# Patient Record
Sex: FEMALE | Race: WHITE | NOT HISPANIC OR LATINO | Employment: FULL TIME | URBAN - METROPOLITAN AREA
[De-identification: names, ages, dates, MRNs, and addresses within clinical notes are randomized per-mention and may not be internally consistent; named-entity substitution may affect disease eponyms.]

---

## 2024-01-23 ENCOUNTER — THERAPY VISIT (OUTPATIENT)
Dept: OCCUPATIONAL THERAPY | Facility: CLINIC | Age: 74
End: 2024-01-23
Payer: COMMERCIAL

## 2024-01-23 DIAGNOSIS — M79.645 CHRONIC THUMB PAIN, BILATERAL: Primary | ICD-10-CM

## 2024-01-23 DIAGNOSIS — M79.644 CHRONIC THUMB PAIN, BILATERAL: Primary | ICD-10-CM

## 2024-01-23 DIAGNOSIS — G89.29 CHRONIC THUMB PAIN, BILATERAL: Primary | ICD-10-CM

## 2024-01-23 PROCEDURE — 97165 OT EVAL LOW COMPLEX 30 MIN: CPT | Mod: GO | Performed by: OCCUPATIONAL THERAPIST

## 2024-01-23 PROCEDURE — 97535 SELF CARE MNGMENT TRAINING: CPT | Mod: GO | Performed by: OCCUPATIONAL THERAPIST

## 2024-01-23 PROCEDURE — 97760 ORTHOTIC MGMT&TRAING 1ST ENC: CPT | Mod: GO | Performed by: OCCUPATIONAL THERAPIST

## 2024-01-23 NOTE — PROGRESS NOTES
"OCCUPATIONAL THERAPY EVALUATION  Type of Visit: Evaluation    See electronic medical record for Abuse and Falls Screening details.    Subjective   Presenting condition or subjective complaint:  Bilateral thumb carpometacarpal osteoarthritis  Date of onset: A few years ago, worsened over the past year    Relevant medical history:  Past Medical History:   Diagnosis Date    Hyperlipidemia      Dates & types of surgery:  Past Surgical History:   Procedure Laterality Date    COLONOSCOPY      mother colon cancer    COLONOSCOPY  1/21/2014    Procedure: COLONOSCOPY;  COLONOSCOPY ;  Surgeon: Flor Brown MD;  Location:  GI    GYN SURGERY      hy/[facility]    ORTHOPEDIC SURGERY      bilat [facility]     Prior diagnostic imaging/testing results: X-ray. Per report, mild degenerative change of the IP and CMC joints of the thumb. Mild degenerative change of the IP and CMC joints of the right thumb.    Prior therapy history for the same diagnosis, illness or injury: None    Prior level of function:  Transfers: Independent  Ambulation: Independent  ADL: Independent  IADL: Independent    Living environment: Patient is independent at home and there are no concerns about accessibility and mobility in the home.    Employment: Retired, watches grandchild one day per week  Hand dominance: Left, uses right hand for scissors and sporting activities  Hobbies/Interests: Gardening    Patient goals for therapy: \"Pain relief and strength in hand.\"       Objective   Pain Level (Scale 0-10)   1/23/2024   At Rest 5/10   With Use Up to 6-7/10     Pain Description  Date 1/23/2024   Location Bilateral thumb CMC joints   Pain Quality Aching   Frequency Constant     Pain is worst Daytime   Exacerbated by Lifting, gripping, pinching, holding a book   Relieved by Voltaren gel,    Progression Gradually worsening     ROM  Thumb 1/23/2024 1/23/2024   AROM  Left Right   MP 0/20 0/30   IP 0/70 0/80   RABD 55 65   PABD 53 55   [place] Opposition Scale " (0-10/10) Small finger PIP joint Small finger PIP joint     Thumb Observation/Appearance   - none  + mild    ++ moderate    +++ severe     1/23/2024   Shoulder deformity present over CMC R: -  L: +   Edema over the CMC joint R: -  L: -   Hyperextension of the thumb MP joint R: Mild  L: Mild      Provocative Tests  Pain Report:  - none    + mild    ++ moderate    +++ severe     1/23/2024   CMC Adduction Stress Test R: ++  L: +   CMC Extension Stress Test R: ++  L: +       Strength:  Pain-free /Pinch Test   1/23/2024 1/23/2024   Trials L R   1   13+ 24+   1/23/2024- Pinch testing deferred due to pain.      Treatment Progression and Home Program:  Note: items in bold must be done  Session / Tx Home Program - add notes as needed   Session 1: Eval / discuss plan (schedule 4 visits)  Assess/discuss if orthosis is needed   Make orthosis if pt has periods of 2/10 pain or more, lasting 30+ minutes in the last 4 weeks (or, assess for and recommend an OTC orthosis)    Try heat in clinic: hotpack or paraffin    Discuss: Pain management overview, strategies  -Plan for heat at home (if indicated)    1st DI AROM in  C  teaching and practice    Only if time allows: provide handout for joint protection   Wear orthosis if indicated  If 3/10 pain or more at rest, wear most of the time for 2 weeks.   If 2/10 pain or less at rest, wear only with activity, intermittently or at night, per pt needs    Heat for home if indicated (1-2 x/day 10 minutes)   Prescribe for home if pt has periods of 2/10 pain or more, lasting 30+ minutes in the last 4 weeks    Pain strategies: take Rx per MD, no painful activities  1st DI in C position- 3 sets of 10, 3x/day  Provide HEP logs / educate in log use    If time: Provide JPE/AE handout for home review     Session 2  Check pain levels and strategies  Check orthosis, modify PRN  Isometric 1st DI / OP in  C  with band    Mobs in clinic: therapist performs, if indicated  Self mobs: practice and  provide handout    Webspace release: therapist can perform in clinic.   Self web release methods to perform at home    Webspace stretch : teach and practice   Wear orthosis as indicated per therapist  Pain strategies as needed. Record use of heat only if needed    1st DI / OP isometric with band: 2 sets of 5 reps, 3 x/day, hold each rep 10 seconds  Mobs at home: 3 times per day (2 reps, hold for count of 5-10)  Webspace release with clip, ball, self massage 1-3 minutes, 2 x/day  Webspace stretch 3x/day 2 reps, hold each rep 10-30 seconds  Provide HEP logs     Session 3  Check pain levels and strategies  Check webspace release, stretch, mobs  Check orthosis, modify PRN. May discuss other orthosis designs or OTC options  1st DI  in  C  w/band (isotonic 1st DI, isometric OP)  OP isotonic with band  Place and hold pinch  Thumb joint protection handout and adaptive equipment list - go over handouts, practice using strategies and equipment in clinic Orthosis, mobs, pain strategies, heat if needed     Webspace release: clip, ball, self massage 1-3 minutes, 2 x/day  Webspace stretch 3x/day 2 reps, each rep 10-30 seconds  1st DI  in  C  w/band (isotonic 1st DI, isometric OP)  3 sets of 10, 3x/day  OP isotonic with band 1 set of 10 reps 3x/day  Place & Hold pinch 5 reps, 2 sets, 3x/day, each rep 10 seconds  JPE/AE handouts    Provide HEP logs   Session 4 (last study visit)  Reassess and plan to discharge vs continue per pt needs  Review pain management as needed  Orthosis use - continue or as needed.   Check strength/stability (5 reps each:  Ext,flex, Adduction, PABd) If difficult, may need to add FPB/EPB or other exercises    Review 1st DI and OP in  C  (isotonic 1st DI,   isometric OP)  Review OP isotonic with band  Review place and hold pinch   NMRE: transport small objects, no MPj HE  Firing 1st DI while lifting items       For the study: continue HEP until visit with study evaluator   Provide logs  Webspace stretch  3x/day 2 reps, each rep 10-30 seconds  1st DI  in  C  w/band (isotonic 1st DI, isometric OP)  3 sets of 10, 3x/day  OP isotonic with band 1 set of 10 reps 3x/day  Place & Hold pinch 5 reps, 2 sets, 3x/day, each rep 10 seconds  Continue joint protection, avoid unstable thumb postures with activity  (Other management recommendations per therapist discretion at this time - orthosis, mobs, web release, heat)    Discuss/assign for long term program for thumb stability maintenance after the study. Recommendations:  3 times a week:  1st DI / OP in C position: 3 sets of 30, active motion or rubber band  Place and hold pinchin sets of 5, hold each rep for count of 5  Webspace stretching 2 reps, hold each rep 10-30 seconds  Continue joint protection techniques  Continue use of 1st DI and MPj stabilization during daily activities  Orthosis, heat as needed  Mobilizations, clip, massage - as needed     Assessment & Plan   CLINICAL IMPRESSIONS  Medical Diagnosis: Bilateral thumb carpometacarpal osteoarthritis    Treatment Diagnosis: Bilateral thumb pain    Impression/Assessment: Pt is a 73 year old female presenting to Occupational Therapy due to the above condition.  The following significant findings have been identified: Impaired activity tolerance, Impaired ROM, Impaired strength, and Pain.  These identified deficits interfere with their ability to perform self care tasks, recreational activities, household chores, driving , and meal planning and preparation as compared to previous level of function.   Patient's limitations or Problem List includes: Pain, Decreased ROM/motion, Decreased , and Decreased pinch of the bilateral thumb which interferes with the patient's ability to perform Self Care Tasks (dressing, eating, bathing, hygiene/toileting), Sleep Patterns, Recreational Activities, Household Chores, and Driving  as compared to previous level of function.    Clinical Decision Making (Complexity):  Assessment of  Occupational Performance: 5 or more Performance Deficits  Occupational Performance Limitations: bathing/showering, toileting, dressing, feeding, hygiene and grooming, child rearing, driving and community mobility, home establishment and management, meal preparation and cleanup, shopping, and leisure activities  Clinical Decision Making (Complexity): Low complexity    PLAN OF CARE  Treatment Interventions:  Modalities:  Paraffin  Therapeutic Exercise:  AROM, PROM, Tendon Gliding, Isotonics, Isometrics, and Stabilization  Neuromuscular re-education:  Kinesiotaping  Manual Techniques:  Joint mobilization and Myofascial release  Orthotic Fabrication:  Hand-based thumb spica orthosis  Self Care:  Joint protection techniques and adaptive equipment education      Long Term Goals   OT Goal 1  Goal Identifier: ADL  Goal Description: Patient able to don/doff clothing with 2/10 thumb pain or less.  Rationale: In order to maximize safety and independence with performance of self-care activities  Target Date: 03/23/24      Frequency of Treatment: 2x/month  Duration of Treatment: 2 months     Recommended Referrals to Other Professionals:  N/A  Education Assessment: Learner/Method: Patient;No Barriers to Learning     Risks and benefits of evaluation/treatment have been explained.   Patient/Family/caregiver agrees with Plan of Care.     Evaluation Time:    OT Eval, Low Complexity Minutes (93287): 30    Signing Clinician: KATY Campbell [facility]                                                                                   OUTPATIENT OCCUPATIONAL THERAPY      PLAN OF TREATMENT FOR OUTPATIENT REHABILITATION   Patient's Last Name, First Name, Shania Bradley YOB: 1950   Provider's Name   [facility]   Medical Record No.  [id number]     Onset Date: 12/22/23 (Diagnosis date) Start of Care Date: 01/23/24     Medical Diagnosis:  Bilateral  thumb carpometacarpal osteoarthritis      OT Treatment Diagnosis:  Bilateral thumb pain Plan of Treatment  Frequency/Duration:2x/month/2 months    Certification date from 01/23/24   To 03/23/24        See note for plan of treatment details and functional goals     Debbie Maradiaga OT                         I CERTIFY THE NEED FOR THESE SERVICES FURNISHED UNDER        THIS PLAN OF TREATMENT AND WHILE UNDER MY CARE .             Physician Signature               Date    X_____________________________________________________                  Referring Provider: Karyl Agrawal MD      Initial Assessment  See Epic Evaluation- 01/23/24

## 2024-01-24 ENCOUNTER — TRANSCRIBE ORDERS (OUTPATIENT)
Dept: OTHER | Age: 74
End: 2024-01-24

## 2024-02-06 ENCOUNTER — THERAPY VISIT (OUTPATIENT)
Dept: OCCUPATIONAL THERAPY | Facility: CLINIC | Age: 74
End: 2024-02-06
Payer: COMMERCIAL

## 2024-02-06 DIAGNOSIS — M79.644 CHRONIC THUMB PAIN, BILATERAL: Primary | ICD-10-CM

## 2024-02-06 DIAGNOSIS — M79.645 CHRONIC THUMB PAIN, BILATERAL: Primary | ICD-10-CM

## 2024-02-06 DIAGNOSIS — G89.29 CHRONIC THUMB PAIN, BILATERAL: Primary | ICD-10-CM

## 2024-02-06 PROCEDURE — 97110 THERAPEUTIC EXERCISES: CPT | Mod: GO | Performed by: OCCUPATIONAL THERAPIST

## 2024-02-06 NOTE — PROGRESS NOTES
Treatment Progression and Home Program:  Note: items in bold must be done  Session / Tx Home Program - add notes as needed   Session 1: Eval / discuss plan (schedule 4 visits)  Assess/discuss if orthosis is needed   Make orthosis if pt has periods of 2/10 pain or more, lasting 30+ minutes in the last 4 weeks (or, assess for and recommend an OTC orthosis)    Try heat in clinic: hotpack or paraffin    Discuss: Pain management overview, strategies  -Plan for heat at home (if indicated)    1st DI AROM in  C  teaching and practice    Only if time allows: provide handout for joint protection   Wear orthosis if indicated  If 3/10 pain or more at rest, wear most of the time for 2 weeks.   If 2/10 pain or less at rest, wear only with activity, intermittently or at night, per pt needs    Heat for home if indicated (1-2 x/day 10 minutes)   Prescribe for home if pt has periods of 2/10 pain or more, lasting 30+ minutes in the last 4 weeks    Pain strategies: take Rx per MD, no painful activities  1st DI in C position- 3 sets of 10, 3x/day  Provide HEP logs / educate in log use    If time: Provide JPE/AE handout for home review     Session 2  Check pain levels and strategies  Check orthosis, modify PRN  Isometric 1st DI / OP in  C  with band    Mobs in clinic: therapist performs, if indicated  Self mobs: practice and provide handout    Webspace release: therapist can perform in clinic.   Self web release methods to perform at home    Webspace stretch : teach and practice   Wear orthosis as indicated per therapist  Pain strategies as needed. Record use of heat only if needed    1st DI / OP isometric with band: 2 sets of 5 reps, 3 x/day, hold each rep 10 seconds  Mobs at home: 3 times per day (2 reps, hold for count of 5-10)  Webspace release with clip, ball, self massage 1-3 minutes, 2 x/day  Webspace stretch 3x/day 2 reps, hold each rep 10-30 seconds  Provide HEP logs     Session 3  Check pain levels and strategies  Check  webspace release, stretch, mobs  Check orthosis, modify PRN. May discuss other orthosis designs or OTC options  1st DI  in  C  w/band (isotonic 1st DI, isometric OP)  OP isotonic with band  Place and hold pinch  Thumb joint protection handout and adaptive equipment list - go over handouts, practice using strategies and equipment in clinic Orthosis, mobs, pain strategies, heat if needed     Webspace release: clip, ball, self massage 1-3 minutes, 2 x/day  Webspace stretch 3x/day 2 reps, each rep 10-30 seconds  1st DI  in  C  w/band (isotonic 1st DI, isometric OP)  3 sets of 10, 3x/day  OP isotonic with band 1 set of 10 reps 3x/day  Place & Hold pinch 5 reps, 2 sets, 3x/day, each rep 10 seconds  JPE/AE handouts    Provide HEP logs   Session 4 (last study visit)  Reassess and plan to discharge vs continue per pt needs  Review pain management as needed  Orthosis use - continue or as needed.   Check strength/stability (5 reps each:  Ext,flex, Adduction, PABd) If difficult, may need to add FPB/EPB or other exercises    Review 1st DI and OP in  C  (isotonic 1st DI,   isometric OP)  Review OP isotonic with band  Review place and hold pinch   NMRE: transport small objects, no MPj HE  Firing 1st DI while lifting items       For the study: continue HEP until visit with study evaluator   Provide logs  Webspace stretch 3x/day 2 reps, each rep 10-30 seconds  1st DI  in  C  w/band (isotonic 1st DI, isometric OP)  3 sets of 10, 3x/day  OP isotonic with band 1 set of 10 reps 3x/day  Place & Hold pinch 5 reps, 2 sets, 3x/day, each rep 10 seconds  Continue joint protection, avoid unstable thumb postures with activity  (Other management recommendations per therapist discretion at this time - orthosis, mobs, web release, heat)    Discuss/assign for long term program for thumb stability maintenance after the study. Recommendations:  3 times a week:  1st DI / OP in C position: 3 sets of 30, active motion or rubber band  Place and hold  pinchin sets of 5, hold each rep for count of 5  Webspace stretching 2 reps, hold each rep 10-30 seconds  Continue joint protection techniques  Continue use of 1st DI and MPj stabilization during daily activities  Orthosis, heat as needed  Mobilizations, clip, massage - as needed

## 2024-02-20 ENCOUNTER — THERAPY VISIT (OUTPATIENT)
Dept: OCCUPATIONAL THERAPY | Facility: CLINIC | Age: 74
End: 2024-02-20
Payer: COMMERCIAL

## 2024-02-20 DIAGNOSIS — G89.29 CHRONIC THUMB PAIN, BILATERAL: Primary | ICD-10-CM

## 2024-02-20 DIAGNOSIS — M79.644 CHRONIC THUMB PAIN, BILATERAL: Primary | ICD-10-CM

## 2024-02-20 DIAGNOSIS — M79.645 CHRONIC THUMB PAIN, BILATERAL: Primary | ICD-10-CM

## 2024-02-20 PROCEDURE — 97110 THERAPEUTIC EXERCISES: CPT | Mod: GO | Performed by: OCCUPATIONAL THERAPIST

## 2024-02-20 PROCEDURE — 97535 SELF CARE MNGMENT TRAINING: CPT | Mod: GO | Performed by: OCCUPATIONAL THERAPIST

## 2024-03-05 ENCOUNTER — THERAPY VISIT (OUTPATIENT)
Dept: OCCUPATIONAL THERAPY | Facility: CLINIC | Age: 74
End: 2024-03-05
Payer: COMMERCIAL

## 2024-03-05 DIAGNOSIS — G89.29 CHRONIC THUMB PAIN, BILATERAL: Primary | ICD-10-CM

## 2024-03-05 DIAGNOSIS — M79.645 CHRONIC THUMB PAIN, BILATERAL: Primary | ICD-10-CM

## 2024-03-05 DIAGNOSIS — M79.644 CHRONIC THUMB PAIN, BILATERAL: Primary | ICD-10-CM

## 2024-03-05 PROCEDURE — 97535 SELF CARE MNGMENT TRAINING: CPT | Mod: GO | Performed by: OCCUPATIONAL THERAPIST

## 2024-03-05 PROCEDURE — 97110 THERAPEUTIC EXERCISES: CPT | Mod: GO | Performed by: OCCUPATIONAL THERAPIST

## 2024-03-05 PROCEDURE — 97760 ORTHOTIC MGMT&TRAING 1ST ENC: CPT | Mod: GO | Performed by: OCCUPATIONAL THERAPIST

## 2024-03-05 NOTE — PROGRESS NOTES
Presenting condition or subjective complaint:  Bilateral thumb carpometacarpal osteoarthritis  Date of onset: A few years ago, worsened over the past year    Pain Level (Scale 0-10)   1/23/2024 3/5/2024   At Rest 5/10 Today: 4-5/10  Past week: 2/10   With Use Up to 6-7/10      Pain Description  Date 1/23/2024   Location Bilateral thumb CMC joints   Pain Quality Aching   Frequency Constant     Pain is worst Daytime   Exacerbated by Lifting, gripping, pinching, holding a book   Relieved by Voltaren gel,    Progression Gradually worsening     ROM  Thumb 1/23/2024 1/23/2024 3/5/2024 3/5/2024   AROM  Left Right Left Right   MP 0/20 0/30     IP 0/70 0/80     RABD 55 65 70 67   PABD 53 55 55 65   [facility] Opposition Scale (0-10/10) Small finger PIP joint Small finger PIP joint       Thumb Observation/Appearance   - none  + mild    ++ moderate    +++ severe     1/23/2024   Shoulder deformity present over CMC R: -  L: +   Edema over the CMC joint R: -  L: -   Hyperextension of the thumb MP joint R: Mild  L: Mild      Provocative Tests  Pain Report:  - none    + mild    ++ moderate    +++ severe     1/23/2024   CMC Adduction Stress Test R: ++  L: +   CMC Extension Stress Test R: ++  L: +       Strength:  Pain-free /Pinch Test   1/23/2024 1/23/2024 3/5/2024 3/5/2024   Trials L R L R   1   13+ 24+ 34+ 35+     Lat Pinch 3/5/2024 3/5/2024   Trials R L   1   8+, thumb MP and CMC joints 8+, thumb MP and CMC joints       Treatment Progression and Home Program:  Note: items in bold must be done  Session / Tx Home Program - add notes as needed   Session 1: Eval / discuss plan (schedule 4 visits)  Assess/discuss if orthosis is needed   Make orthosis if pt has periods of 2/10 pain or more, lasting 30+ minutes in the last 4 weeks (or, assess for and recommend an OTC orthosis)    Try heat in clinic: hotpack or paraffin    Discuss: Pain management overview, strategies  -Plan for heat at home (if indicated)    1st DI AROM in  C   teaching and practice    Only if time allows: provide handout for joint protection   Wear orthosis if indicated  If 3/10 pain or more at rest, wear most of the time for 2 weeks.   If 2/10 pain or less at rest, wear only with activity, intermittently or at night, per pt needs    Heat for home if indicated (1-2 x/day 10 minutes)   Prescribe for home if pt has periods of 2/10 pain or more, lasting 30+ minutes in the last 4 weeks    Pain strategies: take Rx per MD, no painful activities  1st DI in C position- 3 sets of 10, 3x/day  Provide HEP logs / educate in log use    If time: Provide JPE/AE handout for home review     Session 2  Check pain levels and strategies  Check orthosis, modify PRN  Isometric 1st DI / OP in  C  with band    Mobs in clinic: therapist performs, if indicated  Self mobs: practice and provide handout    Webspace release: therapist can perform in clinic.   Self web release methods to perform at home    Webspace stretch : teach and practice   Wear orthosis as indicated per therapist  Pain strategies as needed. Record use of heat only if needed    1st DI / OP isometric with band: 2 sets of 5 reps, 3 x/day, hold each rep 10 seconds  Mobs at home: 3 times per day (2 reps, hold for count of 5-10)  Webspace release with clip, ball, self massage 1-3 minutes, 2 x/day  Webspace stretch 3x/day 2 reps, hold each rep 10-30 seconds  Provide HEP logs     Session 3  Check pain levels and strategies  Check webspace release, stretch, mobs  Check orthosis, modify PRN. May discuss other orthosis designs or OTC options  1st DI  in  C  w/band (isotonic 1st DI, isometric OP)  OP isotonic with band  Place and hold pinch  Thumb joint protection handout and adaptive equipment list - go over handouts, practice using strategies and equipment in clinic Orthosis, mobs, pain strategies, heat if needed     Webspace release: clip, ball, self massage 1-3 minutes, 2 x/day  Webspace stretch 3x/day 2 reps, each rep 10-30 seconds  1st  DI  in  C  w/band (isotonic 1st DI, isometric OP)  3 sets of 10, 3x/day  OP isotonic with band 1 set of 10 reps 3x/day  Place & Hold pinch 5 reps, 2 sets, 3x/day, each rep 10 seconds  JPE/AE handouts    Provide HEP logs   Session 4 (last study visit)  Reassess and plan to discharge vs continue per pt needs  Review pain management as needed  Orthosis use - continue or as needed.   Check strength/stability (5 reps each:  Ext,flex, Adduction, PABd) If difficult, may need to add FPB/EPB or other exercises    Review 1st DI and OP in  C  (isotonic 1st DI,   isometric OP)  Review OP isotonic with band  Review place and hold pinch   NMRE: transport small objects, no MPj HE  Firing 1st DI while lifting items       For the study: continue HEP until visit with study evaluator   Provide logs  Webspace stretch 3x/day 2 reps, each rep 10-30 seconds  1st DI  in  C  w/band (isotonic 1st DI, isometric OP)  3 sets of 10, 3x/day  OP isotonic with band 1 set of 10 reps 3x/day  Place & Hold pinch 5 reps, 2 sets, 3x/day, each rep 10 seconds  Continue joint protection, avoid unstable thumb postures with activity  (Other management recommendations per therapist discretion at this time - orthosis, mobs, web release, heat)    Discuss/assign for long term program for thumb stability maintenance after the study. Recommendations:  3 times a week:  1st DI / OP in C position: 3 sets of 30, active motion or rubber band  Place and hold pinchin sets of 5, hold each rep for count of 5  Webspace stretching 2 reps, hold each rep 10-30 seconds  Continue joint protection techniques  Continue use of 1st DI and MPj stabilization during daily activities  Orthosis, heat as needed  Mobilizations, clip, massage - as needed